# Patient Record
Sex: FEMALE | Race: OTHER | HISPANIC OR LATINO | Employment: UNEMPLOYED | URBAN - METROPOLITAN AREA
[De-identification: names, ages, dates, MRNs, and addresses within clinical notes are randomized per-mention and may not be internally consistent; named-entity substitution may affect disease eponyms.]

---

## 2020-03-31 ENCOUNTER — HOSPITAL ENCOUNTER (EMERGENCY)
Facility: HOSPITAL | Age: 35
Discharge: HOME | End: 2020-03-31
Attending: EMERGENCY MEDICINE
Payer: MEDICAID

## 2020-03-31 VITALS
RESPIRATION RATE: 20 BRPM | WEIGHT: 170 LBS | HEIGHT: 64 IN | TEMPERATURE: 98 F | DIASTOLIC BLOOD PRESSURE: 59 MMHG | OXYGEN SATURATION: 97 % | HEART RATE: 96 BPM | BODY MASS INDEX: 29.02 KG/M2 | SYSTOLIC BLOOD PRESSURE: 109 MMHG

## 2020-03-31 DIAGNOSIS — B34.9 VIRAL ILLNESS: Primary | ICD-10-CM

## 2020-03-31 DIAGNOSIS — Z34.90 PREGNANCY, UNSPECIFIED GESTATIONAL AGE: ICD-10-CM

## 2020-03-31 DIAGNOSIS — R05.9 COUGH: ICD-10-CM

## 2020-03-31 DIAGNOSIS — J02.9 SORE THROAT: ICD-10-CM

## 2020-03-31 DIAGNOSIS — R07.9 CHEST PAIN: ICD-10-CM

## 2020-03-31 LAB
B-HCG UR QL: POSITIVE
CTP QC/QA: YES
CTP QC/QA: YES
GROUP A STREP, MOLECULAR: NEGATIVE
POC MOLECULAR INFLUENZA A AGN: NEGATIVE
POC MOLECULAR INFLUENZA B AGN: NEGATIVE

## 2020-03-31 PROCEDURE — 93005 ELECTROCARDIOGRAM TRACING: CPT

## 2020-03-31 PROCEDURE — 81025 URINE PREGNANCY TEST: CPT | Performed by: NURSE PRACTITIONER

## 2020-03-31 PROCEDURE — U0002 COVID-19 LAB TEST NON-CDC: HCPCS

## 2020-03-31 PROCEDURE — 99285 EMERGENCY DEPT VISIT HI MDM: CPT | Mod: 25

## 2020-03-31 PROCEDURE — 87651 STREP A DNA AMP PROBE: CPT

## 2020-03-31 PROCEDURE — 87502 INFLUENZA DNA AMP PROBE: CPT

## 2020-03-31 PROCEDURE — 25000003 PHARM REV CODE 250: Performed by: NURSE PRACTITIONER

## 2020-03-31 RX ORDER — ACETAMINOPHEN 500 MG
1000 TABLET ORAL
Status: COMPLETED | OUTPATIENT
Start: 2020-03-31 | End: 2020-03-31

## 2020-03-31 RX ADMIN — ACETAMINOPHEN 1000 MG: 500 TABLET ORAL at 02:03

## 2020-03-31 NOTE — ED PROVIDER NOTES
Encounter Date: 3/31/2020       History     Chief Complaint   Patient presents with    Sore Throat     Pt c/o throat pain and pain with deep breathing and cough since Monday. Denies fever      CC:  Sore throat, cough    HPI: Nataliya Cano, a 34 y.o. female presents to the ED with a 3 day history of sore throat with cough.  She reports midsternal chest pain that occurs only with coughing.  She reports no chest pain at rest, only occurs with cough and palpation. No SOB.  She reports generalized body aches with subjective fevers.  She is pregnant, , 21 weeks 5 days based on LMP.  Her OB is Dr. Vegas.  She reports no abdominal pain, vaginal bleeding, vaginal discharge, or urinary symptoms at this time.     The history is provided by the patient. The history is limited by a language barrier. A  was used (Sukumar).     Review of patient's allergies indicates:  No Known Allergies  History reviewed. No pertinent past medical history.  History reviewed. No pertinent surgical history.  History reviewed. No pertinent family history.  Social History     Tobacco Use    Smoking status: Never Smoker   Substance Use Topics    Alcohol use: Never     Frequency: Never    Drug use: Never     Review of Systems   Constitutional: Positive for fever.   HENT: Positive for congestion and sore throat.    Respiratory: Positive for cough. Negative for shortness of breath.    Cardiovascular: Positive for chest pain (with cough).   Gastrointestinal: Negative for abdominal pain, diarrhea, nausea and vomiting.   Genitourinary: Negative for difficulty urinating, dysuria, vaginal bleeding and vaginal discharge.   Musculoskeletal: Positive for myalgias. Negative for neck pain and neck stiffness.   Skin: Negative for wound.   Neurological: Negative for syncope, weakness and headaches.   Psychiatric/Behavioral: Negative for confusion.       Physical Exam     Initial Vitals [20 1346]   BP Pulse Resp Temp SpO2   108/60  105 18 99.4 °F (37.4 °C) 100 %      MAP       --         Physical Exam    Nursing note and vitals reviewed.  Constitutional: She appears well-developed and well-nourished. She is not diaphoretic. She is cooperative.  Non-toxic appearance. She does not have a sickly appearance. She does not appear ill. No distress.   HENT:   Head: Normocephalic and atraumatic.   Right Ear: Tympanic membrane and external ear normal.   Left Ear: Tympanic membrane and external ear normal.   Nose: Mucosal edema and rhinorrhea present.   Mouth/Throat: Uvula is midline and mucous membranes are normal. No trismus in the jaw. Posterior oropharyngeal erythema (mild) present. No oropharyngeal exudate, posterior oropharyngeal edema or tonsillar abscesses.   Eyes: Conjunctivae and EOM are normal.   Neck: Normal range of motion and phonation normal. Neck supple. No tracheal deviation and normal range of motion present. No neck rigidity.   Cardiovascular: Normal rate, regular rhythm and intact distal pulses.   Pulses:       Radial pulses are 2+ on the right side, and 2+ on the left side.        Dorsalis pedis pulses are 2+ on the right side, and 2+ on the left side.   No lower extremity swelling or edema. Calfs soft without tenderness.    Pulmonary/Chest: Effort normal and breath sounds normal. No stridor. No tachypnea and no bradypnea. No respiratory distress. She has no wheezes. She has no rhonchi. She has no rales. She exhibits tenderness. She exhibits no crepitus.   Abdominal: Soft. She exhibits no mass. There is no tenderness. There is no guarding.    Gravid abdomen   Musculoskeletal: Normal range of motion.   Lymphadenopathy:     She has cervical adenopathy.        Right cervical: Superficial cervical adenopathy present.        Left cervical: Superficial cervical adenopathy present.   Neurological: She is alert and oriented to person, place, and time. She has normal strength. No sensory deficit. Coordination and gait normal. GCS score is 15.  GCS eye subscore is 4. GCS verbal subscore is 5. GCS motor subscore is 6.   Skin: Skin is warm, dry and intact. No bruising and no rash noted. No cyanosis or erythema. Nails show no clubbing.   Psychiatric: She has a normal mood and affect. Her behavior is normal. Thought content normal.         ED Course   Procedures  Labs Reviewed   POCT URINE PREGNANCY - Abnormal; Notable for the following components:       Result Value    POC Preg Test, Ur Positive (*)     All other components within normal limits   GROUP A STREP, MOLECULAR   SARS-COV-2 (COVID-19) QUALITATIVE PCR   POCT INFLUENZA A/B MOLECULAR     EKG Readings: (Independently Interpreted)   Initial Reading: No STEMI. Rhythm: Normal Sinus Rhythm. Heart Rate: 94. Ectopy: No Ectopy. Conduction: Normal. ST Segments: Normal ST Segments. T Waves: Normal. Axis: Normal. Clinical Impression: Normal Sinus Rhythm       Imaging Results          X-Ray Chest AP Portable (Edited Result - FINAL)  Result time 03/31/20 15:29:07    Addendum 1 of 1 by Dionisio Kent MD (03/31/20 15:29:07)      Upon review, relative asymmetric right apical density, likely related to summation given rotation.  Could consider further evaluation with dedicated PA and lateral view for further assessment as warranted.      Electronically signed by: Dionisio Kent  Date:    03/31/2020  Time:    15:29               Final result by Dionisio Kent MD (03/31/20 14:52:28)                 Impression:      As above.      Electronically signed by: Dionisio Kent  Date:    03/31/2020  Time:    14:52             Narrative:    EXAMINATION:  XR CHEST AP PORTABLE    CLINICAL HISTORY:  Cough    TECHNIQUE:  Single frontal view of the chest was performed.    COMPARISON:  None available    FINDINGS:  Patient is slightly rotated.  No large airspace opacity or lobar consolidation.  No pleural effusion or pneumothorax.  Cardiomediastinal silhouette is within normal limits.  No acute osseous abnormality.                                        APC / Resident Notes:   This is an evaluation of a 34 y.o. female that presents to the Emergency Department for sore throat, cough, and CP with cough. The patient is a non-toxic, afebrile, and well appearing female. On physical exam: Appears well hydrated with moist mucus membranes. Neck soft and supple with no meningeal signs or cervical lymphadenopathy. Breath sounds are clear and equal bilaterally with no adventitious breath sounds, tachypnea or respiratory distress with room air pulse ox of 100% and no evidence of hypoxia or increased work of breathing. No respiratory distress. Reproducible anterior chest wall TTP. Reports no pain at rest - only with cough. Vital Signs Are Reassuring. RESULTS: EKG - NSR without ischemia or arrhythmia. Flu Negative. . CXR:  Without focal consolidation.  Given the patients symptoms and pregnancy risk factor- will send off COVID testing.     My overall impression is viral illness with sore throat and cough-chest pain-likely musculoskeletal secondary to coughing. I considered, but at this time, do not suspect OM, OE, strep pharyngitis, meningitis, pneumonia, bacterial sinusitis, or significant dehydration requiring IV fluids or admission.     COVID testing pending, advised the patient to self quarantine. Return precautions give if development of SOB, worsening fevers, or symptoms worsen.  D/C Meds as prescribed. D/C Information: Tylenol/Ibuprofen PRN, Hydration. The diagnosis, treatment plan, instructions for follow-up and reevaluation with Primary Care as well as ED return precautions were discussed and understanding was verbalized. All questions or concerns have been addressed. The case was discussed with, the EKG and Xray have been reviewed by Dr. Martin. NASIM Islas, FNP-C                             Clinical Impression:       ICD-10-CM ICD-9-CM   1. Viral illness B34.9 079.99   2. Cough R05 786.2   3. Chest pain R07.9 786.50   4. Sore  throat J02.9 462   5. Pregnancy, unspecified gestational age Z34.90 V22.2         Disposition:   Disposition: Discharged  Condition: Stable     ED Disposition Condition    Discharge Stable        ED Prescriptions     None        Follow-up Information     Follow up With Specialties Details Why Contact Info    Your Primary Care Doctor  Schedule an appointment as soon as possible for a visit  Please call and schedule an appointment for follow up this week.     [facility] [place]  Schedule an appointment as soon as possible for a visit  For Follow-Up, This Week, If you do not have a Primary Care Doctor [address] [place] [address]  [phone]                                       Julio Tovar, [facility]  03/31/20 1646       Julio Tovar, [facility]  03/31/20 1727       Julio Tovar, [facility]  03/31/20 1800

## 2020-03-31 NOTE — DISCHARGE INSTRUCTIONS
§ Regrese al Departamento de emergencias por cualquier síntoma nuevo o que empeore, incluyendo: fiebre, dolor en el pecho, falta de aliento, pérdida del conocimiento, mareos, debilidad o cualquier otra inquietud.    § Programe morgan naun de seguimiento con euceda médico de atención primaria y obstetra / ginecólogo lo antes posible para volver a verificar concepcion síntomas. Si no tiene nathan, puede comunicarse con el que figura en euceda documentación de marco o también puede llamar al mostrador de citas de la [facility] al [phone] para programar morgan naun con nathna.    Tylenol según sea necesario para el dolor.    Manténgase janie hidratado, tome muchos líquidos.    Usted ha sido examinado para COVID-19 (CORONAVIRUS). Esta prueba demora de 3 a 4 días en regresar. Se recomienda que realice la cuarentena hasta que obtenga los resultados de esta prueba.    Debe continuar el aislamiento hasta que:  Fierro transcurrido al menos 3 días (72 horas) desde la recuperación definida albert la resolución de la fiebre sin el uso de medicamentos para reducir la fiebre y la mejora de los síntomas respiratorios (tos, dificultad para respirar, etc.), y  Fierro transcurrido al menos 7 fabio desde que aparecieron los primeros síntomas.  Después de que haya pasado el aislamiento apropiado, él / kourtney puede regresar a concepcion actividades normales y al trabajo.    -----------------    § Please return to the Emergency Department for any new or worsening symptoms including: fever, chest pain, shortness of breath, loss of consciousness, dizziness, weakness, or any other concerns.     § Schedule an appointment for follow up with your Primary Care Doctor and OB/GYN as soon as possible for a recheck of your symptoms. If you do not have one, you may contact the one listed on your discharge paperwork or you may also call the [facility] Appointment Desk at [phone] to schedule an appointment with one.     Tylenol as needed for pain.     Please stay well  hydrated, drink plenty of fluids.    You have been tested for COVID-19 (CORONAVIRUS). This test takes 3-4 days to come back. It is recommended that you self-quarantine until you get the results of this test.     You should continue isolation until:   At least 3 days (72 hours) have passed since recovery defined as resolution of fever without the use of fever-reducing medications and improvement in respiratory symptoms (cough, shortness of breath, etc), and   At least 7 days have passed since symptoms first appeared.   After the appropriate isolation has passed, he/she may return to normal activities and work.

## 2020-04-01 LAB — SARS-COV-2 RNA RESP QL NAA+PROBE: DETECTED

## 2020-04-06 ENCOUNTER — HOSPITAL ENCOUNTER (EMERGENCY)
Facility: HOSPITAL | Age: 35
Discharge: HOME | End: 2020-04-06
Attending: EMERGENCY MEDICINE
Payer: MEDICAID

## 2020-04-06 VITALS
HEART RATE: 99 BPM | DIASTOLIC BLOOD PRESSURE: 55 MMHG | WEIGHT: 152 LBS | TEMPERATURE: 98 F | SYSTOLIC BLOOD PRESSURE: 103 MMHG | HEIGHT: 64 IN | OXYGEN SATURATION: 98 % | BODY MASS INDEX: 25.95 KG/M2 | RESPIRATION RATE: 20 BRPM

## 2020-04-06 DIAGNOSIS — U07.1 COVID-19 VIRUS INFECTION: Primary | ICD-10-CM

## 2020-04-06 DIAGNOSIS — R05.9 COUGH: ICD-10-CM

## 2020-04-06 PROCEDURE — 99282 EMERGENCY DEPT VISIT SF MDM: CPT

## 2020-04-06 RX ORDER — GUAIFENESIN 100 MG/5ML
100-200 SOLUTION ORAL EVERY 4 HOURS PRN
Qty: 60 ML | Refills: 0 | Status: SHIPPED | OUTPATIENT
Start: 2020-04-06 | End: 2020-04-16

## 2020-04-06 NOTE — DISCHARGE INSTRUCTIONS
Thank you for coming to our Emergency Department today. It is important to remember that some problems are difficult to diagnose and may not be found during your first visit. Be sure to follow up with your primary care doctor and review any labs/imaging that was performed with them. If you do not have a primary care doctor, you may contact the one listed on your discharge paperwork or you may also call the [facility] Appointment Desk at [phone] to schedule an appointment with one.     All medications may potentially have side effects and it is impossible to predict which medications may give you side effects. If you feel that you are having a negative effect of any medication you should immediately stop taking them and seek medical attention.    Return to the ER with any questions/concerns, new/concerning symptoms, worsening or failure to improve. Do not drive or make any important decisions for 24 hours if you have received any pain medications, sedatives or mood altering drugs during your ER visit.

## 2020-04-06 NOTE — ED TRIAGE NOTES
ANU ID# [id number] used for interpretation.  Pt complains of cough, sore throat.  Tested positive for COVID19, symptoms getting worse.  Pt is 5 months pregnant.

## 2020-04-06 NOTE — ED PROVIDER NOTES
Encounter Date: 4/6/2020       History     Chief Complaint   Patient presents with    Cough     Pt has a positive COVID and reports cough, sore throat,shortness of breath, denies fever. Pt is pregnant.     Macedonian speaking only, interviewed by me in [place] covid +, states she did not understand she was covid +, presents for persistent cough/mild sob. No other complaints. Wanted to know why she isn't better.        Review of patient's allergies indicates:  No Known Allergies  No past medical history on file.  No past surgical history on file.  No family history on file.  Social History     Tobacco Use    Smoking status: Never Smoker   Substance Use Topics    Alcohol use: Never     Frequency: Never    Drug use: Never     Review of Systems   Constitutional: Negative.    HENT: Negative.    Eyes: Negative.    Respiratory: Positive for cough.    Cardiovascular: Negative.    Gastrointestinal: Negative.    Endocrine: Negative.    Genitourinary: Negative.    Musculoskeletal: Negative.    Skin: Negative.    Allergic/Immunologic: Negative.    Hematological: Negative.    Psychiatric/Behavioral: Negative.    All other systems reviewed and are negative.      Physical Exam     Initial Vitals [04/06/20 0811]   BP Pulse Resp Temp SpO2   (!) 103/55 99 20 98.3 °F (36.8 °C) 98 %      MAP       --         Physical Exam    Nursing note and vitals reviewed.  Constitutional: She appears well-developed and well-nourished.   HENT:   Head: Normocephalic and atraumatic.   Eyes: Conjunctivae and EOM are normal. Pupils are equal, round, and reactive to light.   Neck: Normal range of motion.   Cardiovascular: Normal rate.   Pulmonary/Chest: No respiratory distress.   Abdominal: She exhibits no distension.   Musculoskeletal: Normal range of motion.   Neurological: She is alert. No cranial nerve deficit. GCS score is 15. GCS eye subscore is 4. GCS verbal subscore is 5. GCS motor subscore is 6.   Skin: Skin is warm and dry.   Psychiatric: She  has a normal mood and affect. Thought content normal.     speaking in full sentences, no resp distress    ED Course   Procedures  Labs Reviewed - No data to display       Imaging Results    None                               I have explained to pt she has covid and must self quarantine. Return precautions given.            Clinical Impression:       ICD-10-CM ICD-9-CM   1. COVID-19 virus infection U07.1    2. Cough R05 786.2                                Sebastian Cunningham MD  04/06/20 4817

## 2020-04-06 NOTE — ED NOTES
MARTTI ID# [id number] used for interpretation of discharge instructions.  Pt verbalized understanding and denied and questions/concerns via MARTTI.

## 2020-06-25 ENCOUNTER — LAB VISIT (OUTPATIENT)
Dept: LAB | Facility: HOSPITAL | Age: 35
End: 2020-06-25
Attending: OBSTETRICS & GYNECOLOGY
Payer: MEDICAID

## 2020-06-25 DIAGNOSIS — Z29.13 NEED FOR RHOGAM DUE TO RH NEGATIVE MOTHER: Primary | ICD-10-CM

## 2020-06-25 LAB
ABO + RH BLD: NORMAL
BLD GP AB SCN CELLS X3 SERPL QL: NORMAL
RH BLD: NORMAL

## 2020-06-25 PROCEDURE — 86901 BLOOD TYPING SEROLOGIC RH(D): CPT | Mod: 91

## 2020-06-25 PROCEDURE — 86901 BLOOD TYPING SEROLOGIC RH(D): CPT

## 2020-06-25 PROCEDURE — 36415 COLL VENOUS BLD VENIPUNCTURE: CPT

## 2020-06-26 ENCOUNTER — HOSPITAL ENCOUNTER (OUTPATIENT)
Dept: OBSTETRICS AND GYNECOLOGY | Facility: HOSPITAL | Age: 35
Discharge: HOME | End: 2020-06-26
Attending: OBSTETRICS & GYNECOLOGY
Payer: MEDICAID

## 2020-06-26 DIAGNOSIS — Z29.13 NEED FOR RHOGAM DUE TO RH NEGATIVE MOTHER: ICD-10-CM

## 2020-06-26 LAB — INJECT RH IG VOL PATIENT: NORMAL ML

## 2020-06-26 PROCEDURE — 63600519 RHOGAM PHARM REV CODE 636 ALT 250 W HCPCS: Performed by: OBSTETRICS & GYNECOLOGY

## 2020-06-26 PROCEDURE — 96372 THER/PROPH/DIAG INJ SC/IM: CPT

## 2020-06-26 RX ADMIN — HUMAN RHO(D) IMMUNE GLOBULIN 300 MCG: 300 INJECTION, SOLUTION INTRAMUSCULAR at 10:06

## 2020-06-26 NOTE — NURSING
1014-Pt presents to L&D for RhoGam injection d/t A(-) blood type.  IM injection given in L deltoid without complications.  Pt denied issues/needs at this time and ambulated off unit in no apparent distress.

## 2025-06-10 ENCOUNTER — HOSPITAL ENCOUNTER (EMERGENCY)
Facility: HOSPITAL | Age: 40
Discharge: HOME | End: 2025-06-10
Attending: EMERGENCY MEDICINE

## 2025-06-10 VITALS
HEIGHT: 61 IN | DIASTOLIC BLOOD PRESSURE: 80 MMHG | BODY MASS INDEX: 28.31 KG/M2 | HEART RATE: 50 BPM | RESPIRATION RATE: 16 BRPM | WEIGHT: 149.94 LBS | OXYGEN SATURATION: 99 % | SYSTOLIC BLOOD PRESSURE: 124 MMHG | TEMPERATURE: 98 F

## 2025-06-10 DIAGNOSIS — S01.81XA LACERATION OF FOREHEAD, INITIAL ENCOUNTER: ICD-10-CM

## 2025-06-10 DIAGNOSIS — S09.90XA INJURY OF HEAD, INITIAL ENCOUNTER: Primary | ICD-10-CM

## 2025-06-10 LAB
B-HCG UR QL: NEGATIVE
CTP QC/QA: YES

## 2025-06-10 PROCEDURE — 90715 TDAP VACCINE 7 YRS/> IM: CPT | Performed by: PHYSICIAN ASSISTANT

## 2025-06-10 PROCEDURE — 99285 EMERGENCY DEPT VISIT HI MDM: CPT | Mod: 25

## 2025-06-10 PROCEDURE — 25000003 PHARM REV CODE 250: Performed by: PHYSICIAN ASSISTANT

## 2025-06-10 PROCEDURE — 12011 RPR F/E/E/N/L/M 2.5 CM/<: CPT

## 2025-06-10 PROCEDURE — 81025 URINE PREGNANCY TEST: CPT

## 2025-06-10 PROCEDURE — 90471 IMMUNIZATION ADMIN: CPT | Performed by: PHYSICIAN ASSISTANT

## 2025-06-10 PROCEDURE — 63600175 PHARM REV CODE 636 W HCPCS: Performed by: PHYSICIAN ASSISTANT

## 2025-06-10 RX ORDER — ACETAMINOPHEN 500 MG
500 TABLET ORAL
Status: COMPLETED | OUTPATIENT
Start: 2025-06-10 | End: 2025-06-10

## 2025-06-10 RX ORDER — IBUPROFEN 600 MG/1
600 TABLET, FILM COATED ORAL
Status: COMPLETED | OUTPATIENT
Start: 2025-06-10 | End: 2025-06-10

## 2025-06-10 RX ORDER — IBUPROFEN 600 MG/1
600 TABLET, FILM COATED ORAL EVERY 6 HOURS PRN
Qty: 20 TABLET | Refills: 0 | Status: SHIPPED | OUTPATIENT
Start: 2025-06-10 | End: 2025-06-15

## 2025-06-10 RX ORDER — CEPHALEXIN 500 MG/1
500 CAPSULE ORAL EVERY 6 HOURS
Qty: 20 CAPSULE | Refills: 0 | Status: SHIPPED | OUTPATIENT
Start: 2025-06-10 | End: 2025-06-15

## 2025-06-10 RX ORDER — LIDOCAINE HYDROCHLORIDE 10 MG/ML
10 INJECTION, SOLUTION INFILTRATION; PERINEURAL
Status: COMPLETED | OUTPATIENT
Start: 2025-06-10 | End: 2025-06-10

## 2025-06-10 RX ORDER — ACETAMINOPHEN 500 MG
500 TABLET ORAL EVERY 4 HOURS PRN
Qty: 20 TABLET | Refills: 0 | Status: SHIPPED | OUTPATIENT
Start: 2025-06-10 | End: 2025-06-15

## 2025-06-10 RX ORDER — CEPHALEXIN 500 MG/1
500 CAPSULE ORAL
Status: COMPLETED | OUTPATIENT
Start: 2025-06-10 | End: 2025-06-10

## 2025-06-10 RX ADMIN — ACETAMINOPHEN 500 MG: 500 TABLET ORAL at 01:06

## 2025-06-10 RX ADMIN — LIDOCAINE HYDROCHLORIDE 10 ML: 10 INJECTION, SOLUTION INFILTRATION; PERINEURAL at 01:06

## 2025-06-10 RX ADMIN — CEPHALEXIN 500 MG: 500 CAPSULE ORAL at 01:06

## 2025-06-10 RX ADMIN — CLOSTRIDIUM TETANI TOXOID ANTIGEN (FORMALDEHYDE INACTIVATED), CORYNEBACTERIUM DIPHTHERIAE TOXOID ANTIGEN (FORMALDEHYDE INACTIVATED), BORDETELLA PERTUSSIS TOXOID ANTIGEN (GLUTARALDEHYDE INACTIVATED), BORDETELLA PERTUSSIS FILAMENTOUS HEMAGGLUTININ ANTIGEN (FORMALDEHYDE INACTIVATED), BORDETELLA PERTUSSIS PERTACTIN ANTIGEN, AND BORDETELLA PERTUSSIS FIMBRIAE 2/3 ANTIGEN 0.5 ML: 5; 2; 2.5; 5; 3; 5 INJECTION, SUSPENSION INTRAMUSCULAR at 01:06

## 2025-06-10 RX ADMIN — IBUPROFEN 600 MG: 600 TABLET ORAL at 02:06

## 2025-06-10 NOTE — DISCHARGE INSTRUCTIONS

## 2025-06-10 NOTE — ED PROVIDER NOTES
Encounter Date: 6/10/2025       History     Chief Complaint   Patient presents with    Head Injury     Pt arrived in ED, c/o hitting head on object while at work. Pt denies any LOC or head, neck, or back pain. Pt reports going to Urgent care and being referred to ED for involuntary eye movement. Pt denies any HA, dizziness, or vision changes. Pt denies any CP, SOB, abd pain or n/v/d     CC: Head Injury    HPI:   39-year-old female no pertinent past medical history not up-to-date on tetanus presenting for evaluation of forehead laceration that occurred at a proximally 0900 today when the patient reports that she was moving 80 lbs of wood on a metal cart which fell forward hitting her on forehead.  She is complaining of headache and neck pain.  Denies LOC, visual disturbance, weakness, paresthesias, gait or speech difficulty back pain, chest pain, abdominal pain, nausea vomiting or other associated symptoms.    The history is provided by the patient and a friend. The history is limited by a language barrier. A  was used ([facility] [id number] in Irish).     Review of patient's allergies indicates:  No Known Allergies  History reviewed. No pertinent past medical history.  History reviewed. No pertinent surgical history.  No family history on file.  Social History[1]  Review of Systems   Constitutional:  Negative for chills and fever.   HENT:  Negative for congestion, ear pain, nosebleeds, rhinorrhea, sore throat and trouble swallowing.    Eyes:  Negative for redness.   Respiratory:  Negative for cough, shortness of breath and stridor.    Cardiovascular:  Negative for chest pain.   Gastrointestinal:  Negative for abdominal pain, constipation, diarrhea, nausea and vomiting.   Genitourinary:  Negative for decreased urine volume, dysuria, frequency, hematuria and urgency.   Musculoskeletal:  Positive for neck pain. Negative for back pain.   Skin:  Positive for wound. Negative for rash.   Neurological:  Positive  for headaches. Negative for dizziness, speech difficulty, weakness, light-headedness and numbness.   Hematological:  Does not bruise/bleed easily.   Psychiatric/Behavioral:  Negative for confusion.        Physical Exam     Initial Vitals [06/10/25 1222]   BP Pulse Resp Temp SpO2   119/60 (!) 56 16 98.2 °F (36.8 °C) 99 %      MAP       --         Physical Exam    Nursing note and vitals reviewed.  Constitutional: She appears well-developed and well-nourished. No distress.   HENT:   Head: Normocephalic.   Right Ear: Hearing, tympanic membrane, external ear and ear canal normal.   Left Ear: Hearing, tympanic membrane, external ear and ear canal normal.   Nose: Nose normal. Mouth/Throat: Uvula is midline and mucous membranes are normal. No oropharyngeal exudate, posterior oropharyngeal edema or posterior oropharyngeal erythema.   1 cm vertical linear laceration to the forehead with surrounding swelling.  Tenderness palpation.  No active bleeding   Eyes: Conjunctivae are normal. Right eye exhibits no discharge. Left eye exhibits no discharge. No scleral icterus.   Neck: No tracheal deviation present.   Pulmonary/Chest: No stridor. No respiratory distress.   Musculoskeletal:         General: Normal range of motion.      Cervical back: Spinous process tenderness present. No muscular tenderness.     Neurological: She is alert. She has normal strength. No cranial nerve deficit or sensory deficit. Coordination and gait normal.   Skin: Skin is warm and dry. No rash noted. No erythema.   Psychiatric: She has a normal mood and affect. Her behavior is normal. Judgment and thought content normal.         ED Course   Lac Repair    Date/Time: 6/10/2025 1:50 PM    Performed by: Hilda Maria PA-C  Authorized by: Giancarlo Martin MD    Consent:     Consent obtained:  Verbal    Consent given by:  Patient    Risks discussed:  Pain and poor cosmetic result  Anesthesia:     Anesthesia method:  Local infiltration    Local  anesthetic:  Lidocaine 1% w/o epi  Laceration details:     Location:  Face    Face location:  Forehead    Length (cm):  1  Exploration:     Hemostasis achieved with:  Direct pressure  Treatment:     Area cleansed with:  Saline    Amount of cleaning:  Standard  Skin repair:     Repair method:  Sutures    Suture size:  4-0    Suture material:  Prolene    Suture technique:  Simple interrupted    Number of sutures:  3  Approximation:     Approximation:  Close  Repair type:     Repair type:  Simple    Labs Reviewed   POCT URINE PREGNANCY       Result Value    POC Preg Test, Ur Negative       Acceptable Yes            Imaging Results              CT Head Without Contrast (Final result)  Result time 06/10/25 15:15:51      Final result by Gm Chandler MD (06/10/25 15:15:51)                   Impression:      1. Left frontal scalp mild soft tissue swelling/contusion.  No displaced skull fracture or acute intracranial abnormality.  2. No maxillofacial acute displaced fracture-dislocation identified.  3. No CT evidence of cervical spine acute osseous traumatic injury.      Electronically signed by: Gm Chandler MD  Date:    06/10/2025  Time:    15:15               Narrative:    EXAMINATION:  CT HEAD WITHOUT CONTRAST; CT MAXILLOFACIAL WITHOUT CONTRAST; CT CERVICAL SPINE WITHOUT CONTRAST    CLINICAL HISTORY:  Facial trauma, blunt;; Neck trauma, midline tenderness (Age 16-64y);    TECHNIQUE:  Low dose axial CT images obtained throughout the head, face and cervical spine without intravenous contrast. Sagittal and coronal reconstructions were performed.    COMPARISON:  None.    FINDINGS:  Head CT:    Intracranial compartment: Brain appears normally formed. Ventricles and sulci are normal in size for age without evidence of hydrocephalus. No extra-axial blood or fluid collections.    The brain parenchyma appears normal. No parenchymal mass, hemorrhage, edema or major vascular distribution  infarct.    Skull/extracranial contents (limited evaluation): No fracture.  Suspected mild soft tissue swelling/contusion overlying the left frontal calvarium.    Maxillofacial CT: Bilateral orbits are symmetric and within normal limits.  No displaced fracture, dislocation or destructive osseous process.  Mucosal thickening left sphenoid sinus.  No air-fluid levels.  Remaining paranasal sinuses, middle ear cavities and mastoid air cells appear clear.  External auditory canals are clear.  No subcutaneous emphysema or radiopaque foreign body.  Bilateral parotid and submandibular glands are within normal limits.  Beam hardening with streak artifact from suspected right-sided dental amalgam somewhat limits evaluation.  Airway is midline and patent.    Cervical spine CT: Bones are well mineralized.  Straightening of the cervical lordosis.  Vertebral body and intervertebral disc space heights appear relatively maintained.  No displaced fracture, dislocation or significant listhesis.  No destructive osseous process.  No significant degenerative change or neural foraminal narrowing.  No high-grade spinal canal stenosis.  No prevertebral soft tissue thickening.  No paraspinal mass or fluid collection.  No subcutaneous emphysema or radiopaque foreign body.    Included airway is relatively midline and patent.  Minimal biapical pleuroparenchymal scarring.  No apical pneumothorax.  Thyroid is within normal limits.  Multiple scattered nonenlarged bilateral cervical lymph nodes noted.                                       CT Maxillofacial Without Contrast (Final result)  Result time 06/10/25 15:15:51      Final result by Gm Chandler MD (06/10/25 15:15:51)                   Impression:      1. Left frontal scalp mild soft tissue swelling/contusion.  No displaced skull fracture or acute intracranial abnormality.  2. No maxillofacial acute displaced fracture-dislocation identified.  3. No CT evidence of cervical spine acute osseous  traumatic injury.      Electronically signed by: Gm Chandler MD  Date:    06/10/2025  Time:    15:15               Narrative:    EXAMINATION:  CT HEAD WITHOUT CONTRAST; CT MAXILLOFACIAL WITHOUT CONTRAST; CT CERVICAL SPINE WITHOUT CONTRAST    CLINICAL HISTORY:  Facial trauma, blunt;; Neck trauma, midline tenderness (Age 16-64y);    TECHNIQUE:  Low dose axial CT images obtained throughout the head, face and cervical spine without intravenous contrast. Sagittal and coronal reconstructions were performed.    COMPARISON:  None.    FINDINGS:  Head CT:    Intracranial compartment: Brain appears normally formed. Ventricles and sulci are normal in size for age without evidence of hydrocephalus. No extra-axial blood or fluid collections.    The brain parenchyma appears normal. No parenchymal mass, hemorrhage, edema or major vascular distribution infarct.    Skull/extracranial contents (limited evaluation): No fracture.  Suspected mild soft tissue swelling/contusion overlying the left frontal calvarium.    Maxillofacial CT: Bilateral orbits are symmetric and within normal limits.  No displaced fracture, dislocation or destructive osseous process.  Mucosal thickening left sphenoid sinus.  No air-fluid levels.  Remaining paranasal sinuses, middle ear cavities and mastoid air cells appear clear.  External auditory canals are clear.  No subcutaneous emphysema or radiopaque foreign body.  Bilateral parotid and submandibular glands are within normal limits.  Beam hardening with streak artifact from suspected right-sided dental amalgam somewhat limits evaluation.  Airway is midline and patent.    Cervical spine CT: Bones are well mineralized.  Straightening of the cervical lordosis.  Vertebral body and intervertebral disc space heights appear relatively maintained.  No displaced fracture, dislocation or significant listhesis.  No destructive osseous process.  No significant degenerative change or neural foraminal narrowing.  No  high-grade spinal canal stenosis.  No prevertebral soft tissue thickening.  No paraspinal mass or fluid collection.  No subcutaneous emphysema or radiopaque foreign body.    Included airway is relatively midline and patent.  Minimal biapical pleuroparenchymal scarring.  No apical pneumothorax.  Thyroid is within normal limits.  Multiple scattered nonenlarged bilateral cervical lymph nodes noted.                                       CT Cervical Spine Without Contrast (Final result)  Result time 06/10/25 15:15:51      Final result by Gm Chandler MD (06/10/25 15:15:51)                   Impression:      1. Left frontal scalp mild soft tissue swelling/contusion.  No displaced skull fracture or acute intracranial abnormality.  2. No maxillofacial acute displaced fracture-dislocation identified.  3. No CT evidence of cervical spine acute osseous traumatic injury.      Electronically signed by: Gm Chandler MD  Date:    06/10/2025  Time:    15:15               Narrative:    EXAMINATION:  CT HEAD WITHOUT CONTRAST; CT MAXILLOFACIAL WITHOUT CONTRAST; CT CERVICAL SPINE WITHOUT CONTRAST    CLINICAL HISTORY:  Facial trauma, blunt;; Neck trauma, midline tenderness (Age 16-64y);    TECHNIQUE:  Low dose axial CT images obtained throughout the head, face and cervical spine without intravenous contrast. Sagittal and coronal reconstructions were performed.    COMPARISON:  None.    FINDINGS:  Head CT:    Intracranial compartment: Brain appears normally formed. Ventricles and sulci are normal in size for age without evidence of hydrocephalus. No extra-axial blood or fluid collections.    The brain parenchyma appears normal. No parenchymal mass, hemorrhage, edema or major vascular distribution infarct.    Skull/extracranial contents (limited evaluation): No fracture.  Suspected mild soft tissue swelling/contusion overlying the left frontal calvarium.    Maxillofacial CT: Bilateral orbits are symmetric and within normal limits.  No  displaced fracture, dislocation or destructive osseous process.  Mucosal thickening left sphenoid sinus.  No air-fluid levels.  Remaining paranasal sinuses, middle ear cavities and mastoid air cells appear clear.  External auditory canals are clear.  No subcutaneous emphysema or radiopaque foreign body.  Bilateral parotid and submandibular glands are within normal limits.  Beam hardening with streak artifact from suspected right-sided dental amalgam somewhat limits evaluation.  Airway is midline and patent.    Cervical spine CT: Bones are well mineralized.  Straightening of the cervical lordosis.  Vertebral body and intervertebral disc space heights appear relatively maintained.  No displaced fracture, dislocation or significant listhesis.  No destructive osseous process.  No significant degenerative change or neural foraminal narrowing.  No high-grade spinal canal stenosis.  No prevertebral soft tissue thickening.  No paraspinal mass or fluid collection.  No subcutaneous emphysema or radiopaque foreign body.    Included airway is relatively midline and patent.  Minimal biapical pleuroparenchymal scarring.  No apical pneumothorax.  Thyroid is within normal limits.  Multiple scattered nonenlarged bilateral cervical lymph nodes noted.                                       Medications   LIDOcaine HCL 10 mg/ml (1%) injection 10 mL (10 mLs Infiltration Given 6/10/25 1344)   acetaminophen tablet 500 mg (500 mg Oral Given 6/10/25 1344)   Tdap vaccine injection 0.5 mL (0.5 mLs Intramuscular Given 6/10/25 1344)   cephALEXin capsule 500 mg (500 mg Oral Given 6/10/25 1344)   ibuprofen tablet 600 mg (600 mg Oral Given 6/10/25 1430)     Medical Decision Making  39 -year-old female presenting for evaluation of head injury that occurred prior to arrival at work.  CT head maxillofacial and C-spine are negative for fracture dislocation or acute abnormality.  C-Spine is neurovascularly intact.  Considered but low suspicion for  cervical spine fracture ligamentous injury.  She is neurovascularly intact.  Tetanus was updated in the ED.  Ibuprofen and Tylenol given for pain.  Keflex given due to location of laceration on her face to prevent infection.    [id number] [facility] in [place] used to discuss with the patient 3 sutures placed per procedure note.  Will have her follow up in 5 days for suture removal.  She states this is a work-related injury.  Occupational medicine referral placed as the patient is asking if she will be on disability due to her injury. Will defer to [facility] for further evaluation and management. Instructed her to follow up tomorrow for evaluation.     Likely head injury/forehead laceration. No evidence of concussion at this time.   Return to ER for worsening or as needed      Amount and/or Complexity of Data Reviewed  Radiology: ordered.    Risk  OTC drugs.  Prescription drug management.                                      Clinical Impression:  Final diagnoses:  [S09.90XA] Injury of head, initial encounter (Primary)  [S01.81XA] Laceration of forehead, initial encounter          ED Disposition Condition    Discharge Stable          ED Prescriptions       Medication Sig Dispense Start Date End Date Auth. Provider    ibuprofen (ADVIL,MOTRIN) 600 MG tablet Take 1 tablet (600 mg total) by mouth every 6 (six) hours as needed. 20 tablet 6/10/2025 6/15/2025 Hilda Maria PA-C    acetaminophen (TYLENOL) 500 MG tablet Take 1 tablet (500 mg total) by mouth every 4 (four) hours as needed. 20 tablet 6/10/2025 6/15/2025 Hilda Maria PA-C    cephALEXin (KEFLEX) 500 MG capsule Take 1 capsule (500 mg total) by mouth every 6 (six) hours. for 5 days 20 capsule 6/10/2025 6/15/2025 Hilda Maria PA-C          Follow-up Information       Follow up With Specialties Details Why Contact Info    [facility] - Urgent Care Urgent Care Schedule an appointment as soon as possible for a visit in 2 days for follow up  with Occupational Medicine [address], [address]  [facility] [id number] [phone]    [facility] - Emergency Dept Emergency Medicine Go to  As needed, If symptoms worsen [address]  [facility] [place] [id number] [phone]    [facility] -    [address] [place] [address]  [phone]                     [1]   Social History  Tobacco Use    Smoking status: Never   Substance Use Topics    Alcohol use: Never    Drug use: Never        Hilda Maria PA-C  06/11/25 0712

## 2025-06-15 ENCOUNTER — HOSPITAL ENCOUNTER (EMERGENCY)
Facility: HOSPITAL | Age: 40
Discharge: HOME | End: 2025-06-15
Attending: EMERGENCY MEDICINE

## 2025-06-15 VITALS
SYSTOLIC BLOOD PRESSURE: 125 MMHG | OXYGEN SATURATION: 99 % | WEIGHT: 150 LBS | DIASTOLIC BLOOD PRESSURE: 65 MMHG | TEMPERATURE: 98 F | RESPIRATION RATE: 18 BRPM | BODY MASS INDEX: 28.32 KG/M2 | HEART RATE: 60 BPM

## 2025-06-15 DIAGNOSIS — Z48.02 VISIT FOR SUTURE REMOVAL: Primary | ICD-10-CM

## 2025-06-15 PROCEDURE — 25000003 PHARM REV CODE 250

## 2025-06-15 PROCEDURE — 99999 HC NO LEVEL OF SERVICE - ED ONLY: CPT

## 2025-06-15 RX ORDER — MUPIROCIN 20 MG/G
OINTMENT TOPICAL 3 TIMES DAILY
Qty: 1 G | Refills: 0 | Status: SHIPPED | OUTPATIENT
Start: 2025-06-15

## 2025-06-15 RX ORDER — MUPIROCIN 20 MG/G
1 OINTMENT TOPICAL
Status: COMPLETED | OUTPATIENT
Start: 2025-06-15 | End: 2025-06-15

## 2025-06-15 RX ADMIN — MUPIROCIN 1 TUBE: 20 OINTMENT TOPICAL at 10:06

## 2025-06-15 NOTE — DISCHARGE INSTRUCTIONS
Aplique ungüento de mupirocina en la laceración. Por favor, acuda a urgencias ante cualquier síntoma nuevo o que empeore.  Kala por venir a nuestro Departamento de Emergencias hoy. Es importante recordar que algunos problemas son difíciles de diagnosticar y es posible que no se detecten jose manuel euceda primera visita. Asegúrese de hacer un seguimiento con euceda médico de atención primaria.  Si no tiene nathan, puede comunicarse con el que figura en euceda documentación de marco o también puede llamar a la [facility] al [phone] para programar morgan naun con nathan.    Regrese a la reginaldo de emergencias con cualquier pregunta / inquietud, síntomas nuevos / preocupantes, empeoramiento o falta de mejora. No conduzca ni tome decisiones importantes jose manuel 24 horas si ha recibido analgésicos, sedantes o fármacos que alteran el estado de ánimo jose manuel euceda visita a la reginaldo de emergencias.

## 2025-06-15 NOTE — ED PROVIDER NOTES
Encounter Date: 6/15/2025    SCRIBE #1 NOTE: I, Dilcia Whyte, laquita scribing for, and in the presence of,  Jeremias Osman PA-C. I have scribed the following portions of the note - Other sections scribed: HPI, ROS.       History     Chief Complaint   Patient presents with    Suture / Staple Removal     Pt reports needing sutures removed from her forehead. Pt reports having them placed on 6/10. Pt reports increased pain to the area. Pt denies drainage.      Nataliya Cano is a 39 y.o. female, with no pertinent PMHx, who presents to the ED for suture removal. Patient reports she hit her head while at work on 6/10/25 and sustained a laceration to the left forehead. Patient also reports a headache for the past week and states her eyes feel tired. Patient reports taking prescribed medications as directed. No other exacerbating or alleviating factors. Denies taking blood thinners. Denies fever, drainage, vision changes, or other associated symptoms.      Per chart review, patient was seen at this facility on 6/10/25 after a head injury without loss of consciousness while at work. 1 cm laceration to forehead, 3 sutures placed without complication. Imaging negative. Patient discharged with ibuprofen, acetaminophen, and cephalexin.     The history is provided by the patient. A  was used (#).     Review of patient's allergies indicates:  No Known Allergies  History reviewed. No pertinent past medical history.  History reviewed. No pertinent surgical history.  No family history on file.  Social History[1]  Review of Systems   Constitutional:  Negative for fever.   HENT:  Negative for sore throat.    Eyes:  Negative for photophobia and visual disturbance.        (+) eyes feel tired    Respiratory:  Negative for cough and shortness of breath.    Cardiovascular:  Negative for chest pain and leg swelling.   Gastrointestinal:  Negative for abdominal pain, diarrhea, nausea and vomiting.   Genitourinary:   Negative for dysuria and hematuria.   Musculoskeletal:  Negative for back pain and neck pain.   Skin:  Positive for wound. Negative for rash.        (+) 3 sutures to forehead without drainage    Neurological:  Positive for headaches. Negative for syncope.       Physical Exam     Initial Vitals [06/15/25 1020]   BP Pulse Resp Temp SpO2   125/65 60 18 98.4 °F (36.9 °C) 99 %      MAP       --         Physical Exam    Nursing note and vitals reviewed.  Constitutional: She appears well-developed and well-nourished.   HENT:   Head: Normocephalic and atraumatic.   Right Ear: External ear normal.   Left Ear: External ear normal.   Nose: Nose normal. Mouth/Throat: Oropharynx is clear and moist.   1 cm laceration to the left side of the forehead.  Laceration has 3 sutures.  No evidence of infection.  No surrounding erythema or drainage from the sutures.   Eyes: Conjunctivae, EOM and lids are normal. Pupils are equal, round, and reactive to light.   Neck: Trachea normal. Neck supple.   Cardiovascular:  Normal rate, regular rhythm, S1 normal, S2 normal, normal heart sounds and normal pulses.     Exam reveals no gallop and no friction rub.       No murmur heard.  Pulmonary/Chest: Effort normal and breath sounds normal. No respiratory distress. She has no wheezes. She has no rhonchi. She has no rales.   Abdominal: She exhibits no distension.   Musculoskeletal:         General: Normal range of motion.      Cervical back: Neck supple.      Comments: Patient is able to move all extremities. 5/5 strength at upper and lower extremities.        Lymphadenopathy:     She has no cervical adenopathy.   Neurological: She is alert. She has normal strength. No cranial nerve deficit or sensory deficit. Gait normal.   Skin: Skin is warm and dry.   Psychiatric: She has a normal mood and affect.         ED Course   Suture Removal    Date/Time: 6/15/2025 10:44 AM  Location procedure was performed: [facility] EMERGENCY DEPARTMENT    Performed by:  Jeremias Osman PA-C  Authorized by: Rob Maria MD  Body area: head/neck  Location details: forehead  Wound Appearance: clean, well healed, normal color, nontender and no drainage  Sutures Removed: 3  Staples Removed: 0  Post-removal: dressing applied and antibiotic ointment applied  Complications: No        Labs Reviewed - No data to display       Imaging Results    None          Medications   mupirocin 2 % ointment 1 Tube (1 Tube Topical (Top) Given 6/15/25 1058)     Medical Decision Making  Encounter Date: 6/15/2025    39 y.o. female presents for evaluation of suture removal.  Hemodynamically stable. Afebrile. Phonating and protecting the airway spontaneously. No clinical evidence for cardiovascular instability or impending airway compromise.  Remainder of physical exam as above.   Prior medical records reviewed. PMD note reviewed. Current co-morbidities considered that will impact clinical decision making include as above.   Vitals range:   Temp:  [98.4 °F (36.9 °C)] 98.4 °F (36.9 °C)  Pulse:  [60] 60  Resp:  [18] 18  SpO2:  [99 %] 99 %  BP: (125)/(65) 125/65    Three sutures removed without complication. Wound is well approximated. Wound is healing well without signs of acute bacterial process, including cellulitis and abscess. No visible/palpable foreign body. No neurovascular compromise.  Patient complains of headache no pain to the area of the laceration.  Benign neuro exam. I advised patient to refrain from using the phone or strenuous thinking.  Add patient to gradually return to normal activity. Advised PCP follow up. Strict return precautions discussed. Agreeable to plan.        ED MEDS GIVEN:  Medications  mupirocin 2 % ointment 1 Tube (1 Tube Topical (Top) Given 6/15/25 0579)    Clinical Impression:  Final diagnoses:  [Z48.02] Visit for suture removal (Primary)    I discussed with the patient/family the diagnosis, treatment plan, indications for return to the emergency department, and for  expected follow-up. The patient/family verbalized an understanding. The patient/family is asked if there are any questions or concerns. We discuss the case, until all issues are addressed to the patient/family's satisfaction. Patient/family understands and is agreeable to the plan.   Jeremias Osman    DISCLAIMER: This note was prepared with Whistle voice recognition transcription software.       Amount and/or Complexity of Data Reviewed  External Data Reviewed: notes.     Details: See HPI.     Risk  Prescription drug management.            Scribe Attestation:   Scribe #1: I performed the above scribed service and the documentation accurately describes the services I performed. I attest to the accuracy of the note.                             I, Jeremias Osman PA-C, personally performed the services described in this documentation. All medical record entries made by the scribe were at my direction and in my presence. I have reviewed the chart and agree that the record reflects my personal performance and is accurate and complete.      DISCLAIMER: This note was prepared with Whistle voice recognition transcription software. Garbled syntax, mangled pronouns, and other bizarre constructions may be attributed to that software system.    Clinical Impression:  Final diagnoses:  [Z48.02] Visit for suture removal (Primary)          ED Disposition Condition    Discharge Stable          ED Prescriptions       Medication Sig Dispense Start Date End Date Auth. Provider    mupirocin (BACTROBAN) 2 % ointment Apply topically 3 (three) times daily. 1 g 6/15/2025 -- Jeremias Osman PA-C          Follow-up Information       Follow up With Specialties Details Why Contact [facility] - Emergency Dept Emergency Medicine Go to  As needed, If symptoms worsen [address] [facility] [place] [id number] [phone]    [facility] -  Schedule an appointment as soon  as possible for a visit in 1 day For re-evaluation and to establish care with PCP if you do not already have one [address] [facility] [place] [address]  [phone]                     [1]   Social History  Tobacco Use    Smoking status: Never   Substance Use Topics    Alcohol use: Never    Drug use: Never        Jeremias Osman PA-C  06/15/25 [id number]

## 2025-06-15 NOTE — Clinical Note
"Nataliya"Windy Cano was seen and treated in our emergency department on 6/15/2025.  She may return to work on 06/16/2025.       If you have any questions or concerns, please don't hesitate to call.      Jeremias Osman PA-C"

## 2025-06-16 ENCOUNTER — TELEPHONE (OUTPATIENT)
Dept: URGENT CARE | Facility: CLINIC | Age: 40
End: 2025-06-16

## 2025-06-17 ENCOUNTER — TELEPHONE (OUTPATIENT)
Dept: URGENT CARE | Facility: CLINIC | Age: 40
End: 2025-06-17